# Patient Record
Sex: MALE | Race: OTHER | HISPANIC OR LATINO | URBAN - METROPOLITAN AREA
[De-identification: names, ages, dates, MRNs, and addresses within clinical notes are randomized per-mention and may not be internally consistent; named-entity substitution may affect disease eponyms.]

---

## 2017-01-30 ENCOUNTER — EMERGENCY (EMERGENCY)
Facility: HOSPITAL | Age: 47
LOS: 1 days | Discharge: HOME | End: 2017-01-30
Attending: EMERGENCY MEDICINE | Admitting: EMERGENCY MEDICINE
Payer: COMMERCIAL

## 2017-01-30 VITALS
HEART RATE: 110 BPM | DIASTOLIC BLOOD PRESSURE: 76 MMHG | TEMPERATURE: 98 F | OXYGEN SATURATION: 98 % | WEIGHT: 311.95 LBS | RESPIRATION RATE: 18 BRPM | SYSTOLIC BLOOD PRESSURE: 150 MMHG

## 2017-01-30 VITALS
HEART RATE: 89 BPM | OXYGEN SATURATION: 97 % | RESPIRATION RATE: 18 BRPM | DIASTOLIC BLOOD PRESSURE: 73 MMHG | SYSTOLIC BLOOD PRESSURE: 143 MMHG

## 2017-01-30 DIAGNOSIS — Z98.84 BARIATRIC SURGERY STATUS: Chronic | ICD-10-CM

## 2017-01-30 PROCEDURE — 71020: CPT | Mod: 26

## 2017-01-30 PROCEDURE — 71046 X-RAY EXAM CHEST 2 VIEWS: CPT

## 2017-01-30 PROCEDURE — 99283 EMERGENCY DEPT VISIT LOW MDM: CPT | Mod: 25

## 2017-01-30 PROCEDURE — 93010 ELECTROCARDIOGRAM REPORT: CPT

## 2017-01-30 PROCEDURE — 93005 ELECTROCARDIOGRAM TRACING: CPT

## 2017-01-30 PROCEDURE — 36415 COLL VENOUS BLD VENIPUNCTURE: CPT

## 2017-01-30 PROCEDURE — 80053 COMPREHEN METABOLIC PANEL: CPT

## 2017-01-30 PROCEDURE — 99285 EMERGENCY DEPT VISIT HI MDM: CPT | Mod: 25

## 2017-01-30 PROCEDURE — 85025 COMPLETE CBC W/AUTO DIFF WBC: CPT

## 2017-01-30 PROCEDURE — 84484 ASSAY OF TROPONIN QUANT: CPT

## 2017-01-30 NOTE — ED PROVIDER NOTE - MEDICAL DECISION MAKING DETAILS
Pt w/now second visit for sxs c/w panic attack by his own description, reassuring evaluation here and thorough outpatient cardiac w/u after first episode. Pt requesting referral to new PMD. No meds requested here, advised to discuss possible antidepressants as controller but no immed threat to self or others requiring emergent psych exam.

## 2017-01-30 NOTE — ED PROVIDER NOTE - OBJECTIVE STATEMENT
46yom with h/o 46yom with h/o remote gastric bypass p/w nonexertional CP, SOB, palpitations, lightheadedness, beginning while at work just PTA. Had similar episode ~5 mos ago, was seen here w/normal w/u, referred to cardiology and had normal stress test at that time. Thinks he may be suffering from panic attacks, symptoms have resolved now. No recent fevers, cough, GI illnesses, HA. Has had ~1 yr of RLE numbness for which he has seen a neurologist and had normal EMG testing but this is unchanged. He had Chikungunya virus a couple yrs ago and feels all of his ailments started after this and has seen a rheumatologist and had his "blood sent to the [facility] but never heard back" and has not yet found an etiology for his condition

## 2017-01-30 NOTE — ED ADULT TRIAGE NOTE - CHIEF COMPLAINT QUOTE
midsternal CP that started while sitting at work, associated with dizziness with spinning and SOB. Appears anxious.

## 2017-01-30 NOTE — ED PROVIDER NOTE - CONSTITUTIONAL, MLM
normal... Well appearing obese young male, awake, alert, oriented to person, place, time/situation and in no acute distress.

## 2017-01-30 NOTE — ED PROVIDER NOTE - MUSCULOSKELETAL, MLM
Neck supple, normal range of motion, +brawny chronic-appearing edema of bilateral lower extremities to mid-shin

## 2017-02-03 DIAGNOSIS — R07.89 OTHER CHEST PAIN: ICD-10-CM

## 2017-02-03 DIAGNOSIS — Z79.82 LONG TERM (CURRENT) USE OF ASPIRIN: ICD-10-CM

## 2017-02-03 DIAGNOSIS — Z88.5 ALLERGY STATUS TO NARCOTIC AGENT: ICD-10-CM

## 2017-02-03 DIAGNOSIS — Z87.891 PERSONAL HISTORY OF NICOTINE DEPENDENCE: ICD-10-CM

## 2022-01-09 NOTE — ED ADULT NURSE NOTE - CAS TRG GEN SKIN COLOR
[facility] ED Handoff Report    ED Chief Complaint: shortness of breath    ED Diagnosis:  (R09.02) Hypoxia  (primary encounter diagnosis)      (R05.9) Cough      (R06.02) Shortness of breath      (N17.9) Acute kidney injury (H)    (R79.89) Elevated d-dimer      (U07.1) Infection due to 2019 novel coronavirus         PMH:    Past Medical History:   Diagnosis Date     Alcohol abuse      Diabetes mellitus, type 2 (H)      Schizoaffective disorder, bipolar type (H)      Tobacco abuse         Code Status:  No Order     Falls Risk: Yes Band: Applied    Current Living Situation/Residence: lives with a significant other     Elimination Status: Continent: purewick in place     Activity Level: SBA w/ walker    Patients Preferred Language:  English     Needed: No    Vital Signs:  BP (!) 146/70   Pulse 104   Temp 97.5  F (36.4  C) (Oral)   Resp 22   Wt 75.8 kg (167 lb)   SpO2 97%   BMI 30.54 kg/m       Cardiac Rhythm: NSR    Pain Score: 0/10, patient states she has slight soreness to her chest from coughing    Is the Patient Confused:  No    Last Food or Drink: 01/08/22 at before arrival    Focused Assessment:  Patient came to the ED with known COVID infection (5+days, unable to get accurate diagnosis date), patient was experiencing increased shortness of breath and generalized weakness at home. She has had an occasional non-productive cough. Patient states at baseline she has right sided weakness. She ambulates with a cane at home and has a history of falls, sometimes uses a walker at home. She is currently on 5L O2 via [employer]. Patient has been really sleepy in the ED but awakens to questions and is A&O x4. Patient is able to make needs known. Purewick in place.     Tests Performed: Done: Labs and Imaging    Family Dynamics/Concerns: No    Family Updated On Visitor Policy: No    Plan of Care Communicated to Family: No    Who Was Updated about Plan of Care: patient    Belongings Checklist Done and  Signed by Patient: Yes    Covid: symptomatic, positive        RN: Lyric Up 1/8/2022 8:46 PM        Normal for race